# Patient Record
Sex: FEMALE
[De-identification: names, ages, dates, MRNs, and addresses within clinical notes are randomized per-mention and may not be internally consistent; named-entity substitution may affect disease eponyms.]

---

## 2019-04-08 ENCOUNTER — HOSPITAL ENCOUNTER (OUTPATIENT)
Dept: HOSPITAL 92 - BICULT | Age: 24
Discharge: HOME | End: 2019-04-08
Attending: OBSTETRICS & GYNECOLOGY
Payer: COMMERCIAL

## 2019-04-08 DIAGNOSIS — N63.23: Primary | ICD-10-CM

## 2019-04-08 NOTE — ULT
LEFT BREAST ULTRASOUND LIMITED:

 

HISTORY: 

Left breast lump, palpable finding at 6 o'clock.

 

COMPARISON: 

None.

 

FINDINGS: 

The region around the 6 o'clock position with attenuation to the area of palpable concern is evaluate
d.  There is some asymmetric echogenic glandular tissue.  No solid or cystic mass.

 

IMPRESSION: 

No mass or cyst or other significant abnormal ultrasound finding to account for a palpable finding at
 6 o'clock.

 

If the patient develops any new palpable finding, followup ultrasound at that time might be considere
d.  Consider annual screening study at age 35-40 dependent upon risk factors.

 

POS: OFF